# Patient Record
Sex: MALE | Race: WHITE | Employment: FULL TIME | URBAN - METROPOLITAN AREA
[De-identification: names, ages, dates, MRNs, and addresses within clinical notes are randomized per-mention and may not be internally consistent; named-entity substitution may affect disease eponyms.]

---

## 2023-10-23 ENCOUNTER — TELEPHONE (OUTPATIENT)
Dept: PRIMARY CARE | Facility: CLINIC | Age: 53
End: 2023-10-23

## 2023-10-23 ENCOUNTER — APPOINTMENT (OUTPATIENT)
Dept: RADIOLOGY | Facility: HOSPITAL | Age: 53
End: 2023-10-23

## 2023-10-23 ENCOUNTER — HOSPITAL ENCOUNTER (EMERGENCY)
Facility: HOSPITAL | Age: 53
Discharge: HOME | End: 2023-10-23
Attending: EMERGENCY MEDICINE

## 2023-10-23 VITALS
TEMPERATURE: 97.7 F | DIASTOLIC BLOOD PRESSURE: 109 MMHG | SYSTOLIC BLOOD PRESSURE: 166 MMHG | RESPIRATION RATE: 14 BRPM | BODY MASS INDEX: 33.64 KG/M2 | WEIGHT: 235 LBS | OXYGEN SATURATION: 95 % | HEART RATE: 76 BPM | HEIGHT: 70 IN

## 2023-10-23 DIAGNOSIS — R00.2 PALPITATIONS: Primary | ICD-10-CM

## 2023-10-23 DIAGNOSIS — R07.9 CHEST PAIN, UNSPECIFIED TYPE: ICD-10-CM

## 2023-10-23 DIAGNOSIS — F19.10 SUBSTANCE ABUSE (MULTI): ICD-10-CM

## 2023-10-23 DIAGNOSIS — I10 HYPERTENSION, UNSPECIFIED TYPE: ICD-10-CM

## 2023-10-23 LAB
ALBUMIN SERPL-MCNC: 4.3 G/DL (ref 3.5–5)
ALP BLD-CCNC: 43 U/L (ref 35–125)
ALT SERPL-CCNC: 43 U/L (ref 5–40)
AMPHETAMINES UR QL SCN>1000 NG/ML: NEGATIVE
ANION GAP SERPL CALC-SCNC: 13 MMOL/L
APPEARANCE UR: CLEAR
AST SERPL-CCNC: 23 U/L (ref 5–40)
BARBITURATES UR QL SCN>300 NG/ML: NEGATIVE
BASOPHILS # BLD AUTO: 0.05 X10*3/UL (ref 0–0.1)
BASOPHILS NFR BLD AUTO: 0.7 %
BENZODIAZ UR QL SCN>300 NG/ML: NEGATIVE
BILIRUB SERPL-MCNC: 0.6 MG/DL (ref 0.1–1.2)
BILIRUB UR STRIP.AUTO-MCNC: NEGATIVE MG/DL
BUN SERPL-MCNC: 22 MG/DL (ref 8–25)
BZE UR QL SCN>300 NG/ML: NEGATIVE
CALCIUM SERPL-MCNC: 9.6 MG/DL (ref 8.5–10.4)
CANNABINOIDS UR QL SCN>50 NG/ML: POSITIVE
CHLORIDE SERPL-SCNC: 101 MMOL/L (ref 97–107)
CO2 SERPL-SCNC: 23 MMOL/L (ref 24–31)
COLOR UR: ABNORMAL
CREAT SERPL-MCNC: 1 MG/DL (ref 0.4–1.6)
EOSINOPHIL # BLD AUTO: 0.21 X10*3/UL (ref 0–0.7)
EOSINOPHIL NFR BLD AUTO: 3 %
ERYTHROCYTE [DISTWIDTH] IN BLOOD BY AUTOMATED COUNT: 14 % (ref 11.5–14.5)
ETHANOL SERPL-MCNC: <0.01 G/DL
FENTANYL+NORFENTANYL UR QL SCN: NEGATIVE
GFR SERPL CREATININE-BSD FRML MDRD: >90 ML/MIN/1.73M*2
GLUCOSE SERPL-MCNC: 193 MG/DL (ref 65–99)
GLUCOSE UR STRIP.AUTO-MCNC: ABNORMAL MG/DL
HCT VFR BLD AUTO: 44.4 % (ref 41–52)
HGB BLD-MCNC: 14.9 G/DL (ref 13.5–17.5)
IMM GRANULOCYTES # BLD AUTO: 0.02 X10*3/UL (ref 0–0.7)
IMM GRANULOCYTES NFR BLD AUTO: 0.3 % (ref 0–0.9)
KETONES UR STRIP.AUTO-MCNC: NEGATIVE MG/DL
LEUKOCYTE ESTERASE UR QL STRIP.AUTO: NEGATIVE
LYMPHOCYTES # BLD AUTO: 2.12 X10*3/UL (ref 1.2–4.8)
LYMPHOCYTES NFR BLD AUTO: 30.2 %
MCH RBC QN AUTO: 28.6 PG (ref 26–34)
MCHC RBC AUTO-ENTMCNC: 33.6 G/DL (ref 32–36)
MCV RBC AUTO: 85 FL (ref 80–100)
METHADONE UR QL SCN>300 NG/ML: NEGATIVE
MONOCYTES # BLD AUTO: 0.43 X10*3/UL (ref 0.1–1)
MONOCYTES NFR BLD AUTO: 6.1 %
NEUTROPHILS # BLD AUTO: 4.19 X10*3/UL (ref 1.2–7.7)
NEUTROPHILS NFR BLD AUTO: 59.7 %
NITRITE UR QL STRIP.AUTO: NEGATIVE
NRBC BLD-RTO: 0 /100 WBCS (ref 0–0)
NT-PROBNP SERPL-MCNC: <36 PG/ML (ref 0–138)
OPIATES UR QL SCN>300 NG/ML: NEGATIVE
OXYCODONE UR QL: NEGATIVE
PCP UR QL SCN>25 NG/ML: NEGATIVE
PH UR STRIP.AUTO: 6 [PH]
PLATELET # BLD AUTO: 253 X10*3/UL (ref 150–450)
PMV BLD AUTO: 9.8 FL (ref 7.5–11.5)
POTASSIUM SERPL-SCNC: 4.4 MMOL/L (ref 3.4–5.1)
PROT SERPL-MCNC: 7.2 G/DL (ref 5.9–7.9)
PROT UR STRIP.AUTO-MCNC: ABNORMAL MG/DL
RBC # BLD AUTO: 5.21 X10*6/UL (ref 4.5–5.9)
RBC # UR STRIP.AUTO: NEGATIVE /UL
RBC #/AREA URNS AUTO: NORMAL /HPF
SODIUM SERPL-SCNC: 137 MMOL/L (ref 133–145)
SP GR UR STRIP.AUTO: 1.03
TROPONIN T SERPL-MCNC: 7 NG/L
TROPONIN T SERPL-MCNC: 8 NG/L
TSH SERPL DL<=0.05 MIU/L-ACNC: 2.8 MIU/L (ref 0.27–4.2)
UROBILINOGEN UR STRIP.AUTO-MCNC: NORMAL MG/DL
WBC # BLD AUTO: 7 X10*3/UL (ref 4.4–11.3)
WBC #/AREA URNS AUTO: NORMAL /HPF

## 2023-10-23 PROCEDURE — 80307 DRUG TEST PRSMV CHEM ANLYZR: CPT | Performed by: EMERGENCY MEDICINE

## 2023-10-23 PROCEDURE — 96361 HYDRATE IV INFUSION ADD-ON: CPT

## 2023-10-23 PROCEDURE — 84484 ASSAY OF TROPONIN QUANT: CPT | Performed by: EMERGENCY MEDICINE

## 2023-10-23 PROCEDURE — 99284 EMERGENCY DEPT VISIT MOD MDM: CPT | Mod: 25

## 2023-10-23 PROCEDURE — 85025 COMPLETE CBC W/AUTO DIFF WBC: CPT | Performed by: EMERGENCY MEDICINE

## 2023-10-23 PROCEDURE — 96374 THER/PROPH/DIAG INJ IV PUSH: CPT

## 2023-10-23 PROCEDURE — 93005 ELECTROCARDIOGRAM TRACING: CPT

## 2023-10-23 PROCEDURE — 83880 ASSAY OF NATRIURETIC PEPTIDE: CPT | Performed by: EMERGENCY MEDICINE

## 2023-10-23 PROCEDURE — 36415 COLL VENOUS BLD VENIPUNCTURE: CPT | Performed by: EMERGENCY MEDICINE

## 2023-10-23 PROCEDURE — 2500000004 HC RX 250 GENERAL PHARMACY W/ HCPCS (ALT 636 FOR OP/ED): Performed by: EMERGENCY MEDICINE

## 2023-10-23 PROCEDURE — 81001 URINALYSIS AUTO W/SCOPE: CPT | Performed by: EMERGENCY MEDICINE

## 2023-10-23 PROCEDURE — 82077 ASSAY SPEC XCP UR&BREATH IA: CPT | Performed by: EMERGENCY MEDICINE

## 2023-10-23 PROCEDURE — 84443 ASSAY THYROID STIM HORMONE: CPT | Performed by: EMERGENCY MEDICINE

## 2023-10-23 PROCEDURE — 80053 COMPREHEN METABOLIC PANEL: CPT | Performed by: EMERGENCY MEDICINE

## 2023-10-23 PROCEDURE — 71045 X-RAY EXAM CHEST 1 VIEW: CPT | Mod: FY

## 2023-10-23 PROCEDURE — 2500000001 HC RX 250 WO HCPCS SELF ADMINISTERED DRUGS (ALT 637 FOR MEDICARE OP): Performed by: EMERGENCY MEDICINE

## 2023-10-23 RX ORDER — ASPIRIN 325 MG
325 TABLET ORAL ONCE
Status: COMPLETED | OUTPATIENT
Start: 2023-10-23 | End: 2023-10-23

## 2023-10-23 RX ORDER — FAMOTIDINE 10 MG/ML
20 INJECTION INTRAVENOUS ONCE
Status: COMPLETED | OUTPATIENT
Start: 2023-10-23 | End: 2023-10-23

## 2023-10-23 RX ADMIN — FAMOTIDINE 20 MG: 10 INJECTION INTRAVENOUS at 09:11

## 2023-10-23 RX ADMIN — SODIUM CHLORIDE 500 ML: 900 INJECTION, SOLUTION INTRAVENOUS at 09:11

## 2023-10-23 RX ADMIN — ASPIRIN 325 MG: 325 TABLET ORAL at 09:11

## 2023-10-23 ASSESSMENT — COLUMBIA-SUICIDE SEVERITY RATING SCALE - C-SSRS
1. IN THE PAST MONTH, HAVE YOU WISHED YOU WERE DEAD OR WISHED YOU COULD GO TO SLEEP AND NOT WAKE UP?: NO
2. HAVE YOU ACTUALLY HAD ANY THOUGHTS OF KILLING YOURSELF?: NO
6. HAVE YOU EVER DONE ANYTHING, STARTED TO DO ANYTHING, OR PREPARED TO DO ANYTHING TO END YOUR LIFE?: NO

## 2023-10-23 ASSESSMENT — PAIN DESCRIPTION - DESCRIPTORS: DESCRIPTORS: SHARP

## 2023-10-23 ASSESSMENT — PAIN - FUNCTIONAL ASSESSMENT
PAIN_FUNCTIONAL_ASSESSMENT: 0-10
PAIN_FUNCTIONAL_ASSESSMENT: 0-10

## 2023-10-23 ASSESSMENT — LIFESTYLE VARIABLES
HAVE YOU EVER FELT YOU SHOULD CUT DOWN ON YOUR DRINKING: NO
EVER FELT BAD OR GUILTY ABOUT YOUR DRINKING: NO
HAVE PEOPLE ANNOYED YOU BY CRITICIZING YOUR DRINKING: NO
REASON UNABLE TO ASSESS: NO
EVER HAD A DRINK FIRST THING IN THE MORNING TO STEADY YOUR NERVES TO GET RID OF A HANGOVER: NO

## 2023-10-23 ASSESSMENT — PAIN SCALES - GENERAL
PAINLEVEL_OUTOF10: 2
PAINLEVEL_OUTOF10: 0 - NO PAIN

## 2023-10-23 NOTE — ED TRIAGE NOTES
Patient complains of nonstop palpations and feeling flush lasting all weekend. Has a quick sharp pain and slight nausea. Patient states he has numbness and tingling in legs and feet that is new withthin the past 2 weeks.

## 2023-10-23 NOTE — TELEPHONE ENCOUNTER
See telephone encounter. Nurse spoke with PCP and advised nurse to tell patient to go to ED. Patient advised.

## 2023-10-23 NOTE — DISCHARGE INSTRUCTIONS
Follow up with your primary care physician within 1 to 2 days for further management of your current symptoms and repeat check of your blood pressure      Follow-up with cardiology within 1 week      Return to the emergency department sooner with worsening of symptoms or onset of new symptoms

## 2023-10-23 NOTE — Clinical Note
Ricci Tamara was seen and treated in our emergency department on 10/23/2023.  He may return to work on 10/24/2023.       If you have any questions or concerns, please don't hesitate to call.      Elsie Madsen MD

## 2023-10-23 NOTE — ED PROVIDER NOTES
HPI   Chief Complaint   Patient presents with    Palpitations       This is a 53-year-old male who presents to the emergency department with worsening palpitations.  Patient states for the last 2 years he has had on and off sensation of palpitations with substernal chest tightness..  Patient states he came to the emergency department today because over the weekend his palpitations have been consistent for the most part.  Patient states that he feels his heartbeat in his chest with associated tightness in his chest and feeling of shortness of breath.  Patient also states that he feels occasional tingling in his hands bilaterally with the palpitations.  States when he has the palpitations he feels that he does not want to sit still.  Patient has a past medical history of diabetes and hypertension.  Patient takes metformin, valsartan, an antacid and metoprolol.  Patient did not take his medications this morning.  To include his blood pressure medications. patient denies sudden onset of tearing, ripping, sharp pain radiating from his chest to his back or belly.  Patient denies back or belly pain.            Please see HPI for pertinent positive and negative ROS. The remaining 10 point ROS negative.                  No data recorded                Patient History   No past medical history on file.  No past surgical history on file.  No family history on file.  Social History     Tobacco Use    Smoking status: Not on file    Smokeless tobacco: Not on file   Substance Use Topics    Alcohol use: Not on file    Drug use: Not on file       Physical Exam   ED Triage Vitals   Temp Pulse Resp BP   -- -- -- --      SpO2 Temp src Heart Rate Source Patient Position   -- -- -- --      BP Location FiO2 (%)     -- --       Physical Exam  GENERAL APPEARANCE:  AxOx3, generally well-appearing, appears mildly anxious on presentation.  HEENT:  Normocephalic, atraumatic. Moist mucous membranes. Extraocular movements intact b/l, clear  conjunctiva, anicteric, oropharynx clear.  NECK:  Supple neck. No stiffness or restricted ROM.  HEART:  Normal rate and regular rhythm with clear S1 and S2. No murmur was appreciated on auscultation.   LUNGS:  Lungs CTA b/l without adventitious sounds. Symmetric rise and fall of chest wall.   ABDOMEN:  Soft, nontender, nondistended without palpable masses. EXTREMITIES:  All 4 extremities without visible cyanosis or edema.  NEUROLOGICAL:  Alert and oriented, moving all 4 extremities. Patient answering questions appropriately.   SKIN:  Warm and dry without any rash.  PYSCH: Cooperative with appropriate mood and affect.     ED Course & MDM   ED Course as of 10/23/23 1540   Mon Oct 23, 2023   1324 Reviewed labs just pending chest x-ray.  Labs look reassuring.  Patient has had no acute change in the emergency department.  Will discuss results with patient pending x-ray review. [SC]      ED Course User Index  [SC] Elsa Mccall PA-C         Diagnoses as of 10/23/23 1540   Palpitations   Hypertension, unspecified type   Chest pain, unspecified type   Substance abuse (CMS/formerly [facility])       Medical Decision Making  Patient was seen in conjucntion with my supervising physician,  Dr. Madsen. Please refer to her note.    Parts of this chart have been completed using voice recognition software. Please excuse any errors of transcription.  My thought process and reason for plan has been formulated from the time that I saw the patient until the time of disposition and is not specific to one specific moment during their visit and furthermore my MDM encompasses this entire chart and not only this text box.      HPI: Detailed above.    Exam: A medically appropriate exam performed, outlined above, given the known history and presentation.    History obtained from: Patient    EKG: See my supervising physician's EKG interpretation    Social Determinants of Health considered during this visit: Lives at home    Medications given during  visit:  Medications   famotidine PF (Pepcid) injection 20 mg (20 mg intravenous Given 10/23/23 0911)   sodium chloride 0.9 % bolus 500 mL (0 mL intravenous Stopped 10/23/23 1035)   aspirin tablet 325 mg (325 mg oral Given 10/23/23 0911)        Diagnostic/tests  Labs Reviewed   URINALYSIS WITH REFLEX MICROSCOPIC - Abnormal       Result Value    Color, Urine Light-Yellow      Appearance, Urine Clear      Specific Gravity, Urine 1.026      pH, Urine 6.0      Protein, Urine 10 (TRACE)      Glucose, Urine 150 (2+) (*)     Blood, Urine NEGATIVE      Ketones, Urine NEGATIVE      Bilirubin, Urine NEGATIVE      Urobilinogen, Urine Normal      Nitrite, Urine NEGATIVE      Leukocyte Esterase, Urine NEGATIVE     DRUG SCREEN,URINE - Abnormal    Amphetamine Screen, Urine Negative      Barbiturate Screen, Urine Negative      Benzodiazepines Screen, Urine Negative      Cannabinoid Screen, Urine Positive (*)     Cocaine Metabolite Screen, Urine Negative      Fentanyl Screen, Urine Negative      Methadone Screen, Urine Negative      Opiate Screen, Urine Negative      Oxycodone Screen, Urine Negative      PCP Screen, Urine Negative      Narrative:     These toxicological screening tests provide unconfirmed qualitative measurements to aid in treatment and diagnosis in cases of drug use or overdose. This test is used only for medical purposes. A positive result does not indicate or measure intoxication. For specific test performance or pathologist consultation, please contact the Laboratory.    The following threshold concentrations are used for these analyses.Values at or above the threshold concentration are reported as positive. Values below the threshold are reported as negative.    Drug /Screening Threshold                                                                                                 THC/CANNABINOIDS................50 ng/ml  METHADONE......................300 ng/ml  COCAINE METABOLITES............300  ng/ml  BENZODIAZEPINE.................300 ng/ml  PCP.............................25 ng/ml  OPIATE.........................300 ng/ml  AMPHETAMINE/ECSTASY...........1000 ng/ml  BARBITURATE....................200 ng/ml  OXYCODONE......................100 ng/ml  FENTANYL.........................5 ng/ml       COMPREHENSIVE METABOLIC PANEL - Abnormal    Glucose 193 (*)     Sodium 137      Potassium 4.4      Chloride 101      Bicarbonate 23 (*)     Urea Nitrogen 22      Creatinine 1.00      eGFR >90      Calcium 9.6      Albumin 4.3      Alkaline Phosphatase 43      Total Protein 7.2      AST 23      Bilirubin, Total 0.6      ALT 43 (*)     Anion Gap 13     TSH - Normal    Thyroid Stimulating Hormone 2.80     N-TERMINAL PROBNP - Normal    PROBNP <36      Narrative:     Reference ranges are based on clinical submission data. These ranges represent the 95th percentile of normal cut-off points. As NT Pro- BNP values approach 1000 pg/ml, clinical symptoms are more likely associated with CHF.   ALCOHOL - Normal    Alcohol <0.010     SERIAL TROPONIN, INITIAL ([facility]) - Normal    Troponin T, High Sensitivity 8     SERIAL TROPONIN,  2 HOUR ([facility]) - Normal    Troponin T, High Sensitivity 7     MICROSCOPIC ONLY, URINE - Normal    WBC, Urine 1-5      RBC, Urine NONE     CBC WITH AUTO DIFFERENTIAL    WBC 7.0      nRBC 0.0      RBC 5.21      Hemoglobin 14.9      Hematocrit 44.4      MCV 85      MCH 28.6      MCHC 33.6      RDW 14.0      Platelets 253      MPV 9.8      Neutrophils % 59.7      Immature Granulocytes %, Automated 0.3      Lymphocytes % 30.2      Monocytes % 6.1      Eosinophils % 3.0      Basophils % 0.7      Neutrophils Absolute 4.19      Immature Granulocytes Absolute, Automated 0.02      Lymphocytes Absolute 2.12      Monocytes Absolute 0.43      Eosinophils Absolute 0.21      Basophils Absolute 0.05     TROPONIN T SERIES, HIGH SENSITIVITY (0, 2 HR, 6 HR)    Narrative:     The following orders were created for panel order  Troponin T Series, High Sensitivity (0, 2HR, 6HR).  Procedure                               Abnormality         Status                     ---------                               -----------         ------                     Serial Troponin, Initial...[[id number]]  Normal              Final result               Serial Troponin, 2 Hour ...[[id number]]  Normal              Final result               Serial Troponin, 6 Hour ...[[id number]]                                                   Please view results for these tests on the individual orders.   SERIAL TROPONIN, 6 HOUR ([facility])      XR chest 1 view   Final Result   No acute cardiopulmonary disease.        Signed by: Won Zaman 10/23/2023 2:25 PM   Dictation workstation:   [id number]           Considerations/further MDM:  The patient was seen in conjunction with the supervising physician, Dr. Madsen. Please review her supervision note.    The patient was evaluated in the emergency department and an etiology requiring emergent treatment or admission to hospital was not identified.  All labs, imaging, and diagnostic studies were reviewed by me and the patient was counseled on clinical impression, expectations, and plan.  Patient was educated to follow-up with PCP in the following 1 to 2 days.  Patient was educated that today in the emergency department he had no events on telemetry to suggest a malignant arrhythmia.  Patient was also educated that his emergency department evaluation showed no signs of aortic dissection, pulmonary embolism, pneumothorax, pneumonia, or acute myocardial infarction.  All questions from patient were answered.  He was educated that although a cause of his palpitations and chest tightness were not identified today he needs to follow-up with her primary care doctor for further evaluation.  Patient's blood pressure is elevated due to him not taking his blood pressure medications this morning.  Patient verbalized that he did not take his  blood pressure medication this morning.  Patient to follow-up with his primary care doctor in 1 to 2 days.  Patient was also educated about urine drug screen finding.  Patient was aware that he does use marijuana and states he knows he does need to try to decrease this.  patient did verbalize understanding. they elicited understanding and were agreeable to course of treatment.  Patient was discharged in stable condition and given strict return precautions including new or worsening symptoms.      Wells Score 0  HEART Score 2    Procedure  Procedures     Elsa Mccall PA-C  10/23/23 1540

## 2023-10-23 NOTE — PROGRESS NOTES
Attestation note/provider note for MANJIT Mccall    The patient is a 53-year-old male presenting to the emergency department for evaluation of palpitations, numbness and tingling of his fingers and intermittent substernal chest pain.  He states he has had ongoing symptoms for the past 2 years.  He states it has been worsening over the past 7 months.  He states that sometimes he feels like his fingers get tingly and numb bilaterally.  He states that sometimes he has some substernal chest pain that is sharp.  He states that today he came to the emergency room because he feels like his heart is racing.  He denies any current chest pain.  He denies any diaphoresis.  No headache or visual changes.  No neck or back pain.  No abdominal pain.  No nausea or vomiting.  No diarrhea or constipation but no urinary complaints.  He denies any history of PE or DVT.  No history of CAD or ACS.  He does have a history of hypertension and diabetes.  All pertinent positives and negatives are recorded above.  All other systems reviewed and otherwise negative.  Vital signs with hypertension but otherwise within normal limits.  Physical exam with a well-nourished well-developed male who appears slightly anxious but otherwise has no evidence of acute distress.  HEENT exam within normal limits.  He has no evidence of airway compromise or respiratory distress.  Abdominal exam is benign.  He has no gross motor, neurologic or vascular deficits on exam.  NIH stroke scale score of 0.      EKG with normal sinus rhythm at 84 bpm, normal axis, normal voltage, normal ST segment, normal T waves      Oral aspirin, IV fluids and IV Pepcid ordered.      Diagnostic labs with evidence of substance abuse, electrolyte imbalance and glycosuria but no evidence of DKA.      Initial Troponin T 8.  Repeat Troponin T of 7.  DeltaTroponin T of 1      Heart score of 2      Chest x-ray  IMPRESSION:  No acute cardiopulmonary disease.      The patient did not have any  evidence of ischemia on EKG or cardiac enzymes.  No events on telemetry.  Chest x-ray without acute process.  No pneumonia, pneumothorax no widened mediastinum.  The patient does have a mild electrolyte imbalance and glycosuria on diagnostic labs but no evidence of DKA.  Heart score is 2.      The patient was released in good condition.  He was instructed to follow-up with his primary care physician within 1 to 2 days for further management of his current symptoms and repeat check of his blood pressure.  He was also given a referral to cardiology.  He will return to the emergency department sooner with worsening of symptoms or onset of new symptoms.      I personally saw the patient and performed a substantive portion of the visit including all aspects of the medical decision making.      I reviewed the results of the diagnostic labs and diagnostic imaging.  Formal radiology reading was completed by the radiologist            Elsie Madsen MD

## 2023-10-25 ENCOUNTER — HOSPITAL ENCOUNTER (OUTPATIENT)
Dept: CARDIOLOGY | Facility: HOSPITAL | Age: 53
Discharge: HOME | End: 2023-10-25

## 2023-10-25 LAB
ATRIAL RATE: 84 BPM
P AXIS: 71 DEGREES
P OFFSET: 200 MS
P ONSET: 138 MS
PR INTERVAL: 174 MS
Q ONSET: 225 MS
QRS COUNT: 13 BEATS
QRS DURATION: 84 MS
QT INTERVAL: 360 MS
QTC CALCULATION(BAZETT): 425 MS
QTC FREDERICIA: 402 MS
R AXIS: 100 DEGREES
T AXIS: 50 DEGREES
T OFFSET: 405 MS
VENTRICULAR RATE: 84 BPM

## 2023-12-23 DIAGNOSIS — I10 BENIGN ESSENTIAL HYPERTENSION: ICD-10-CM

## 2023-12-26 PROBLEM — I10 BENIGN ESSENTIAL HYPERTENSION: Status: ACTIVE | Noted: 2023-12-26

## 2023-12-26 PROBLEM — K21.9 GASTROESOPHAGEAL REFLUX DISEASE WITHOUT ESOPHAGITIS: Status: ACTIVE | Noted: 2023-12-26

## 2023-12-26 PROBLEM — F41.1 GAD (GENERALIZED ANXIETY DISORDER): Status: ACTIVE | Noted: 2023-12-26

## 2023-12-28 RX ORDER — VALSARTAN AND HYDROCHLOROTHIAZIDE 320; 12.5 MG/1; MG/1
1 TABLET, FILM COATED ORAL DAILY
Qty: 90 TABLET | Refills: 0 | Status: SHIPPED | OUTPATIENT
Start: 2023-12-28 | End: 2024-04-02

## 2024-01-08 PROBLEM — E11.9 TYPE 2 DIABETES MELLITUS WITHOUT COMPLICATION, WITHOUT LONG-TERM CURRENT USE OF INSULIN (MULTI): Status: ACTIVE | Noted: 2024-01-08

## 2024-03-30 DIAGNOSIS — I10 BENIGN ESSENTIAL HYPERTENSION: ICD-10-CM

## 2024-04-02 RX ORDER — VALSARTAN AND HYDROCHLOROTHIAZIDE 320; 12.5 MG/1; MG/1
1 TABLET, FILM COATED ORAL DAILY
Qty: 30 TABLET | Refills: 0 | Status: SHIPPED | OUTPATIENT
Start: 2024-04-02 | End: 2024-04-18 | Stop reason: SDUPTHER

## 2024-04-02 NOTE — TELEPHONE ENCOUNTER
Patient overdue for follow-up.  30-day coverage sent.  Please encourage him to schedule follow-up at earliest convenience

## 2024-04-17 PROBLEM — G47.33 OSA (OBSTRUCTIVE SLEEP APNEA): Status: ACTIVE | Noted: 2024-04-17

## 2024-04-17 PROBLEM — E55.9 VITAMIN D DEFICIENCY: Status: ACTIVE | Noted: 2024-04-17

## 2024-04-17 NOTE — ASSESSMENT & PLAN NOTE
- A1c sugar average performed in office today which was 9.0%; this is increased compared to 7.1% in June 2023  -Worsening sugars is likely byproduct of poor diet, decreased activity and having been temporarily/modifying your metformin dose  -Will continue on current regimen along with healthy, low-fat diet and moderation of carbohydrates  -Annual diabetic eye exam advocated

## 2024-04-17 NOTE — PROGRESS NOTES
Outpatient Visit Note    Chief Complaint   Patient presents with    Follow-up     6 month f/u         HPI:  Ricci Mcdonald is a 54 y.o. male with a past medical history significant for diabetes, anxiety/insomnia, hypertension, ROSY and vitamin-D deficiencywho presents to the office for overdue follow-up.  He was last seen in the office on 6/6/2023 as a new patient with request to establish care and for medication follow-up.           He was previously established with [facility], having last been seen on 12/09/2022 for medication follow-up    Last blood work completed 09/22/2022 including TSH and vitamin D level which was notable for vitamin-D deficiency.  In interval, patient did present to the emergency department in October 2023 secondary to complaints of chest pain/palpitations.  Blood work was completed at that time which included CBC, CMP, troponins, BNP and TSH.  Blood work was remarkable for hyperglycemia.  Last A1c was performed in June 2023 which was 7.1%          Diabetes:  Patient has been on regimen of max dose metformin with last A1c at 7.1 % on 6/6/2023. Denies any polyuria, polydipsia, polyphagia or acute vision/sensation changes.  Admits to having run out of his medication for several weeks to which she was stretching out his metformin regimen.  With recent prescription refill, he has been taking medication more consistently for the last 3 weeks.  States that his diet was very poor over the last 3 to 4 months, reporting to have been laid off.  During this time, he was less active as well.           Hypertension:  Has been compliant with regimen of valsartan-hydrochlorothiazide 320/12.5 mg daily though was also of blood pressure medication for several weeks. Denies any chest pain, shortness a breath, lightheadedness or dizziness. Denies any overt palpitations at this time. Of note, patient did complete CT coronary calcium scoring in May 2022, to which patient had a 0  score.    Of note, patient does state that approximately 2 weeks ago he did have acute episode of upset stomach with associated diarrhea and nausea.  Since episode, he has continued to have prominent bloating/abdominal discomfort.  Stomach seems to be sensitive with foods that he eats though he does not have consistent/constant diarrhea.  No reports of melena/hematochezia.  Denies ever having GI consultation or colon cancer screening.  Does have history of GERD which he has attempted to avoid trigger foods.    Lastly, he does report ongoing struggles with sleep.  Had been previously utilizing marijuana to help with symptom control though he did not find this to be effective and often made him feel worse.  Denies daily sleep issues though often has trouble falling asleep and staying asleep.  Has previously tried over-the-counter regimens such as melatonin and ZzzQuil which were not well-tolerated.    Current Medications  Current Outpatient Medications   Medication Instructions    metFORMIN (GLUCOPHAGE) 1,000 mg, oral, 2 times daily with meals    OMEPRAZOLE ORAL oral, Every other day    traZODone (DESYREL) 50 mg, oral, Nightly PRN    valsartan-hydrochlorothiazide (Diovan-HCT) 320-12.5 mg tablet 1 tablet, oral, Daily        Allergies  No Known Allergies     Past Medical History:   Diagnosis Date    Anxiety     Diabetes mellitus (Multi)     Insomnia       History reviewed. No pertinent surgical history.  Family History   Problem Relation Name Age of Onset    Pancreatic cancer Mother      Diabetes Father       Social History     Tobacco Use    Smoking status: Never    Smokeless tobacco: Never   Vaping Use    Vaping status: Never Used   Substance Use Topics    Alcohol use: Not Currently    Drug use: Not Currently     Types: Marijuana       ROS  All pertinent positive symptoms are included in the history of present illness.  All other systems have been reviewed and are negative and noncontributory to this patient's current  ailments.      VITAL SIGNS  Vitals:    04/18/24 0832   BP: 146/82   Pulse: 94   Temp: 36.2 °C (97.2 °F)   SpO2: 97%       PHYSICAL EXAM  GENERAL APPEARANCE: alert and oriented, Pleasant and cooperative, No Acute Distress  HEENT: EOMI, PERRLA, MMM  HEART: RRR, normal S1S2, no murmurs, click or rubs  LUNGS: clear to auscultation bilaterally, no wheezes/rhonchi/rales  ABDOMEN: soft, NT/ND, no guarding or rigidity, no masses palpated, no hepatosplenomegaly.   EXTREMITIES: no edema, normal ROM  SKIN: normal, no rash, unremarkable  NEUROLOGIC EXAM: non-focal exam  MUSCULOSKELETAL: no gross abnormalities  PSYCH: affect is normal, eye contact is good      Assessment/Plan   Problem List Items Addressed This Visit             ICD-10-CM    Benign essential hypertension I10     - Blood pressure stable though mildly elevated in office today   -Will focus on compliance with medication along with dietary modification and hopeful weight loss  -Continue to focus on healthy, balanced diet with moderation of salt/caffeine/alcohol         Relevant Medications    valsartan-hydrochlorothiazide (Diovan-HCT) 320-12.5 mg tablet    Other Relevant Orders    TSH with reflex to Free T4 if abnormal    Lipid Panel    Comprehensive Metabolic Panel    CBC    CHELSEA (generalized anxiety disorder) F41.1    Type 2 diabetes mellitus without complication, without long-term current use of insulin (Multi) - Primary E11.9     - A1c sugar average performed in office today which was 9.0%; this is increased compared to 7.1% in June 2023  -Worsening sugars is likely byproduct of poor diet, decreased activity and having been temporarily/modifying your metformin dose  -Will continue on current regimen along with healthy, low-fat diet and moderation of carbohydrates  -Annual diabetic eye exam advocated         Relevant Medications    metFORMIN (Glucophage) 500 mg tablet    Other Relevant Orders    POCT glycosylated hemoglobin (Hb A1C) manually resulted (Completed)     TSH with reflex to Free T4 if abnormal    Lipid Panel    Comprehensive Metabolic Panel    Albumin, urine, random    Vitamin D deficiency E55.9    Relevant Orders    Vitamin D 25-Hydroxy,Total (for eval of Vitamin D levels)    Other insomnia G47.09     - With ongoing sleep issues, recommend trial of trazodone to which I have sent prescription to pharmacy         Relevant Medications    traZODone (Desyrel) 50 mg tablet     Other Visit Diagnoses         Codes    Prostate cancer screening     Z12.5    Relevant Orders    Prostate Spec.Ag,Screen    Colon cancer screening     Z12.11    Relevant Orders    Cologuard® colon cancer screening    Need for hepatitis C screening test     Z11.59    Relevant Orders    Hepatitis C antibody            Counseling:       Medication education:         Education:  The patient is counseled regarding potential side-effects of all new medications        Understanding:  Patient expressed understanding        Adherence:  No barriers to adherence identified

## 2024-04-17 NOTE — ASSESSMENT & PLAN NOTE
- Blood pressure stable though mildly elevated in office today   -Will focus on compliance with medication along with dietary modification and hopeful weight loss  -Continue to focus on healthy, balanced diet with moderation of salt/caffeine/alcohol

## 2024-04-18 ENCOUNTER — OFFICE VISIT (OUTPATIENT)
Dept: PRIMARY CARE | Facility: CLINIC | Age: 54
End: 2024-04-18

## 2024-04-18 VITALS
HEIGHT: 70 IN | BODY MASS INDEX: 35.33 KG/M2 | OXYGEN SATURATION: 97 % | TEMPERATURE: 97.2 F | HEART RATE: 94 BPM | SYSTOLIC BLOOD PRESSURE: 146 MMHG | WEIGHT: 246.8 LBS | DIASTOLIC BLOOD PRESSURE: 82 MMHG

## 2024-04-18 DIAGNOSIS — Z12.11 COLON CANCER SCREENING: ICD-10-CM

## 2024-04-18 DIAGNOSIS — G47.09 OTHER INSOMNIA: ICD-10-CM

## 2024-04-18 DIAGNOSIS — Z11.59 NEED FOR HEPATITIS C SCREENING TEST: ICD-10-CM

## 2024-04-18 DIAGNOSIS — F41.1 GAD (GENERALIZED ANXIETY DISORDER): ICD-10-CM

## 2024-04-18 DIAGNOSIS — I10 BENIGN ESSENTIAL HYPERTENSION: ICD-10-CM

## 2024-04-18 DIAGNOSIS — E11.9 TYPE 2 DIABETES MELLITUS WITHOUT COMPLICATION, WITHOUT LONG-TERM CURRENT USE OF INSULIN (MULTI): Primary | ICD-10-CM

## 2024-04-18 DIAGNOSIS — Z12.5 PROSTATE CANCER SCREENING: ICD-10-CM

## 2024-04-18 DIAGNOSIS — E55.9 VITAMIN D DEFICIENCY: ICD-10-CM

## 2024-04-18 PROBLEM — R14.0 ABDOMINAL BLOATING WITH CRAMPS: Status: ACTIVE | Noted: 2024-04-18

## 2024-04-18 PROBLEM — R10.9 ABDOMINAL BLOATING WITH CRAMPS: Status: ACTIVE | Noted: 2024-04-18

## 2024-04-18 LAB — POC HEMOGLOBIN A1C: 9 % (ref 4.2–6.5)

## 2024-04-18 PROCEDURE — 1036F TOBACCO NON-USER: CPT | Performed by: FAMILY MEDICINE

## 2024-04-18 PROCEDURE — 3079F DIAST BP 80-89 MM HG: CPT | Performed by: FAMILY MEDICINE

## 2024-04-18 PROCEDURE — 99214 OFFICE O/P EST MOD 30 MIN: CPT | Performed by: FAMILY MEDICINE

## 2024-04-18 PROCEDURE — 3077F SYST BP >= 140 MM HG: CPT | Performed by: FAMILY MEDICINE

## 2024-04-18 PROCEDURE — 83036 HEMOGLOBIN GLYCOSYLATED A1C: CPT | Mod: QW | Performed by: FAMILY MEDICINE

## 2024-04-18 RX ORDER — TRAZODONE HYDROCHLORIDE 50 MG/1
50 TABLET ORAL NIGHTLY PRN
Qty: 30 TABLET | Refills: 2 | Status: SHIPPED | OUTPATIENT
Start: 2024-04-18

## 2024-04-18 RX ORDER — METFORMIN HYDROCHLORIDE 500 MG/1
1000 TABLET ORAL
Qty: 360 TABLET | Refills: 1 | Status: SHIPPED | OUTPATIENT
Start: 2024-04-18 | End: 2024-10-15

## 2024-04-18 RX ORDER — VALSARTAN AND HYDROCHLOROTHIAZIDE 320; 12.5 MG/1; MG/1
1 TABLET, FILM COATED ORAL DAILY
Qty: 90 TABLET | Refills: 1 | Status: SHIPPED | OUTPATIENT
Start: 2024-04-18 | End: 2024-10-15

## 2024-04-18 ASSESSMENT — PATIENT HEALTH QUESTIONNAIRE - PHQ9
1. LITTLE INTEREST OR PLEASURE IN DOING THINGS: MORE THAN HALF THE DAYS
2. FEELING DOWN, DEPRESSED OR HOPELESS: NOT AT ALL
10. IF YOU CHECKED OFF ANY PROBLEMS, HOW DIFFICULT HAVE THESE PROBLEMS MADE IT FOR YOU TO DO YOUR WORK, TAKE CARE OF THINGS AT HOME, OR GET ALONG WITH OTHER PEOPLE: SOMEWHAT DIFFICULT
SUM OF ALL RESPONSES TO PHQ9 QUESTIONS 1 AND 2: 2

## 2024-04-18 ASSESSMENT — PAIN SCALES - GENERAL: PAINLEVEL: 2

## 2024-04-18 NOTE — ASSESSMENT & PLAN NOTE
- Low FODMAP handout given  -OTC probiotic advocated  -Assess blood work for any contributing abnormalities though this is likely dietary in nature or byproduct of a recent stomach bug  -Will plan to coordinate Cologuard colon cancer screening with plans to set up formal GI consultation if irregularities continue

## 2024-04-18 NOTE — PATIENT INSTRUCTIONS
Problem List Items Addressed This Visit             ICD-10-CM    Benign essential hypertension I10     - Blood pressure stable though mildly elevated in office today   -Will focus on compliance with medication along with dietary modification and hopeful weight loss  -Continue to focus on healthy, balanced diet with moderation of salt/caffeine/alcohol         Relevant Medications    valsartan-hydrochlorothiazide (Diovan-HCT) 320-12.5 mg tablet    Other Relevant Orders    TSH with reflex to Free T4 if abnormal    Lipid Panel    Comprehensive Metabolic Panel    CBC    CHELSEA (generalized anxiety disorder) F41.1    Type 2 diabetes mellitus without complication, without long-term current use of insulin (Multi) - Primary E11.9     - A1c sugar average performed in office today which was 9.0%; this is increased compared to 7.1% in June 2023  -Worsening sugars is likely byproduct of poor diet, decreased activity and having been temporarily/modifying your metformin dose  -Will continue on current regimen along with healthy, low-fat diet and moderation of carbohydrates  -Annual diabetic eye exam advocated         Relevant Medications    metFORMIN (Glucophage) 500 mg tablet    Other Relevant Orders    POCT glycosylated hemoglobin (Hb A1C) manually resulted    TSH with reflex to Free T4 if abnormal    Lipid Panel    Comprehensive Metabolic Panel    Albumin, urine, random    Vitamin D deficiency E55.9    Relevant Orders    Vitamin D 25-Hydroxy,Total (for eval of Vitamin D levels)    Other insomnia G47.09     - With ongoing sleep issues, recommend trial of trazodone to which I have sent prescription to pharmacy         Relevant Medications    traZODone (Desyrel) 50 mg tablet     Other Visit Diagnoses         Codes    Prostate cancer screening     Z12.5    Relevant Orders    Prostate Spec.Ag,Screen    Colon cancer screening     Z12.11    Relevant Orders    Cologuard® colon cancer screening    Need for hepatitis C screening test      Z11.59    Relevant Orders    Hepatitis C antibody            Counseling:       Medication education:         Education:  The patient is counseled regarding potential side-effects of all new medications        Understanding:  Patient expressed understanding        Adherence:  No barriers to adherence identified

## 2024-04-18 NOTE — ASSESSMENT & PLAN NOTE
- With ongoing sleep issues, recommend trial of trazodone to which I have sent prescription to pharmacy

## 2024-04-24 ENCOUNTER — TELEPHONE (OUTPATIENT)
Dept: PRIMARY CARE | Facility: CLINIC | Age: 54
End: 2024-04-24

## 2024-04-24 DIAGNOSIS — G47.09 OTHER INSOMNIA: Primary | ICD-10-CM

## 2024-04-24 NOTE — TELEPHONE ENCOUNTER
Pt came in to office stating he was having some side effects of his trazodone after taking one dose. Sx are restlessness, wide awake, mind was racing, feeling sad, angry. He states he has been using gummies which have helped him sleep. Pt states he is not doing cologuard at this time d/t cost. Please advise if there is an alternative for trazodone.

## 2024-04-25 NOTE — TELEPHONE ENCOUNTER
Secondary to his sensitivity to trazodone, I would recommend that he be seen in consultation by her sleep medicine folks to discuss other potential therapeutic options.  Referral placed

## 2024-06-10 ENCOUNTER — APPOINTMENT (OUTPATIENT)
Dept: SLEEP MEDICINE | Facility: CLINIC | Age: 54
End: 2024-06-10

## 2024-11-03 DIAGNOSIS — I10 BENIGN ESSENTIAL HYPERTENSION: ICD-10-CM

## 2024-11-05 RX ORDER — VALSARTAN AND HYDROCHLOROTHIAZIDE 320; 12.5 MG/1; MG/1
1 TABLET, FILM COATED ORAL DAILY
Qty: 90 TABLET | Refills: 1 | Status: SHIPPED | OUTPATIENT
Start: 2024-11-05 | End: 2025-05-04

## 2024-11-14 ENCOUNTER — OFFICE VISIT (OUTPATIENT)
Dept: PRIMARY CARE | Facility: CLINIC | Age: 54
End: 2024-11-14

## 2024-11-14 VITALS
HEIGHT: 70 IN | BODY MASS INDEX: 32.78 KG/M2 | OXYGEN SATURATION: 97 % | HEART RATE: 87 BPM | WEIGHT: 229 LBS | TEMPERATURE: 97.7 F | SYSTOLIC BLOOD PRESSURE: 138 MMHG | DIASTOLIC BLOOD PRESSURE: 80 MMHG

## 2024-11-14 DIAGNOSIS — G47.09 OTHER INSOMNIA: ICD-10-CM

## 2024-11-14 DIAGNOSIS — E11.9 TYPE 2 DIABETES MELLITUS WITHOUT COMPLICATION, WITHOUT LONG-TERM CURRENT USE OF INSULIN (MULTI): Primary | ICD-10-CM

## 2024-11-14 DIAGNOSIS — I10 BENIGN ESSENTIAL HYPERTENSION: ICD-10-CM

## 2024-11-14 LAB — POC HEMOGLOBIN A1C: 6.9 % (ref 4.2–6.5)

## 2024-11-14 PROCEDURE — 3079F DIAST BP 80-89 MM HG: CPT | Performed by: FAMILY MEDICINE

## 2024-11-14 PROCEDURE — 83036 HEMOGLOBIN GLYCOSYLATED A1C: CPT | Performed by: FAMILY MEDICINE

## 2024-11-14 PROCEDURE — 1036F TOBACCO NON-USER: CPT | Performed by: FAMILY MEDICINE

## 2024-11-14 PROCEDURE — 3075F SYST BP GE 130 - 139MM HG: CPT | Performed by: FAMILY MEDICINE

## 2024-11-14 PROCEDURE — 3008F BODY MASS INDEX DOCD: CPT | Performed by: FAMILY MEDICINE

## 2024-11-14 PROCEDURE — 99214 OFFICE O/P EST MOD 30 MIN: CPT | Performed by: FAMILY MEDICINE

## 2024-11-14 RX ORDER — METFORMIN HYDROCHLORIDE 500 MG/1
500 TABLET ORAL
Qty: 180 TABLET | Refills: 1 | Status: SHIPPED | OUTPATIENT
Start: 2024-11-14 | End: 2025-05-13

## 2024-11-14 RX ORDER — VALSARTAN AND HYDROCHLOROTHIAZIDE 320; 12.5 MG/1; MG/1
1 TABLET, FILM COATED ORAL DAILY
Qty: 90 TABLET | Refills: 1 | Status: SHIPPED | OUTPATIENT
Start: 2024-11-14 | End: 2025-05-13

## 2024-11-14 RX ORDER — TRAZODONE HYDROCHLORIDE 50 MG/1
50 TABLET ORAL NIGHTLY PRN
Qty: 30 TABLET | Refills: 2 | Status: SHIPPED | OUTPATIENT
Start: 2024-11-14 | End: 2024-11-14 | Stop reason: ENTERED-IN-ERROR

## 2024-11-14 ASSESSMENT — PATIENT HEALTH QUESTIONNAIRE - PHQ9
SUM OF ALL RESPONSES TO PHQ9 QUESTIONS 1 AND 2: 0
2. FEELING DOWN, DEPRESSED OR HOPELESS: NOT AT ALL
1. LITTLE INTEREST OR PLEASURE IN DOING THINGS: NOT AT ALL

## 2024-11-14 ASSESSMENT — PAIN SCALES - GENERAL: PAINLEVEL_OUTOF10: 0-NO PAIN

## 2024-11-14 NOTE — PROGRESS NOTES
Outpatient Visit Note    Chief Complaint   Patient presents with    Follow-up         HPI:  Ricci Mcdonald is a 54 y.o. male with a past medical history significant for diabetes, anxiety/insomnia, hypertension, ROSY and vitamin-D deficiency who presents to the office for follow-up.  He was last seen in the office in April 2024 for overdue follow-up.           He was previously established with [facility], having last been seen on 12/09/2022 for medication follow-up.    Last blood work completed 09/22/2022 including TSH and vitamin D level which was notable for vitamin-D deficiency.  In interval, patient did present to the emergency department in October 2023 secondary to complaints of chest pain/palpitations.  Blood work was completed at that time which included CBC, CMP, troponins, BNP and TSH.  Blood work was remarkable for hyperglycemia.  Orders for repeat blood work were given at last encounter which have yet to be completed.    A1c was performed in June 2023 which was 7.1% with most recent A1c in April showing increased to 9%.  Increase in sugar average was attributed towards poor diet, limited exercise and patient modifying/stretching out metformin.  Plan was set to return to structured regimen with healthy lifestyle and 3-month follow-up though patient has not been seen since today          Diabetes:  Patient has been on regimen of max dose metformin with last A1c at 7.1 % on 6/6/2023.  In interval he reports onset of dizziness approximately 3 months ago which improved upon decreasing metformin to 500 mg twice a day.  He denies any polyuria, polydipsia, polyphagia or acute vision/sensation changes.  States that his diet has been fairly poor recently though has made a concerted effort to eat better with a nearly 20 pound weight since April.           Hypertension:  Has been compliant with regimen of valsartan-hydrochlorothiazide 320/12.5 mg daily though was also of blood pressure  medication for several weeks. Denies any chest pain, shortness a breath, lightheadedness or dizziness. Denies any overt palpitations at this time. Of note, patient did complete CT coronary calcium scoring in May 2022, to which patient had a 0 score.    At last encounter he reported ongoing struggles with sleep.  Had been previously utilizing marijuana to help with symptom control though only when using an edible form.  Has been out recently and attempted to smoke marijuana which was not well-tolerated.  Had previously tried over-the-counter regimens such as melatonin and ZzzQuil which were not well-tolerated.  Trial of trazodone was ultimately discussed with prescription sent.  States filled prescription though never tried medication.  Will plan to try and see if this helps.    Current Medications  Current Outpatient Medications   Medication Instructions    metFORMIN (GLUCOPHAGE) 500 mg, oral, 2 times daily (morning and late afternoon)    OMEPRAZOLE ORAL Take by mouth. Every other day    valsartan-hydrochlorothiazide (Diovan-HCT) 320-12.5 mg tablet 1 tablet, oral, Daily        Allergies  No Known Allergies     Past Medical History:   Diagnosis Date    Anxiety     Diabetes mellitus (Multi)     Insomnia       History reviewed. No pertinent surgical history.  Family History   Problem Relation Name Age of Onset    Pancreatic cancer Mother      Diabetes Father       Social History     Tobacco Use    Smoking status: Never    Smokeless tobacco: Never   Vaping Use    Vaping status: Never Used   Substance Use Topics    Alcohol use: Not Currently    Drug use: Not Currently     Types: Marijuana       ROS  All pertinent positive symptoms are included in the history of present illness.  All other systems have been reviewed and are negative and noncontributory to this patient's current ailments.    VITAL SIGNS  Vitals:    11/14/24 1430   BP: 138/80   Pulse: 87   Temp: 36.5 °C (97.7 °F)   SpO2: 97%     PHYSICAL EXAM  GENERAL  APPEARANCE: alert and oriented, Pleasant and cooperative, No Acute Distress  HEENT: EOMI, PERRLA, MMM  HEART: RRR, normal S1S2, no murmurs, click or rubs  LUNGS: clear to auscultation bilaterally, no wheezes/rhonchi/rales  ABDOMEN: soft, NT/ND, no guarding or rigidity, no masses palpated, no hepatosplenomegaly.   EXTREMITIES: no edema, normal ROM  SKIN: normal, no rash, unremarkable  NEUROLOGIC EXAM: non-focal exam  MUSCULOSKELETAL: no gross abnormalities  PSYCH: affect is normal, eye contact is good      Assessment/Plan   Problem List Items Addressed This Visit             ICD-10-CM    Benign essential hypertension I10     - Blood pressure stable in office today   -Will focus on compliance with medication along with dietary modification and hopeful weight loss  -Continue to focus on healthy, balanced diet with moderation of salt/caffeine/alcohol         Relevant Medications    valsartan-hydrochlorothiazide (Diovan-HCT) 320-12.5 mg tablet    Type 2 diabetes mellitus without complication, without long-term current use of insulin (Multi) - Primary E11.9     - A1c sugar average performed in office today which was 6.9%; this is improved compared to 9.0% in April 2024  - will continue with twice daily metformin 500mg   -Will continue on current regimen along with healthy, low-fat diet and moderation of carbohydrates  -Annual diabetic eye exam advocated         Relevant Medications    metFORMIN (Glucophage) 500 mg tablet    Other Relevant Orders    POCT glycosylated hemoglobin (Hb A1C) manually resulted (Completed)    Other insomnia G47.09     - Will plan to trial trazodone utilizing previous prescription obtained          Please complete fasting blood work at earliest convenience    Counseling:       Medication education:         Education:  The patient is counseled regarding potential side-effects of all new medications        Understanding:  Patient expressed understanding        Adherence:  No barriers to adherence  identified

## 2024-11-14 NOTE — ASSESSMENT & PLAN NOTE
- A1c sugar average performed in office today which was 6.9%; this is improved compared to 9.0% in April 2024  - will continue with twice daily metformin 500mg   -Will continue on current regimen along with healthy, low-fat diet and moderation of carbohydrates  -Annual diabetic eye exam advocated

## 2024-11-14 NOTE — PATIENT INSTRUCTIONS
Problem List Items Addressed This Visit             ICD-10-CM    Benign essential hypertension I10     - Blood pressure stable in office today   -Will focus on compliance with medication along with dietary modification and hopeful weight loss  -Continue to focus on healthy, balanced diet with moderation of salt/caffeine/alcohol         Relevant Medications    valsartan-hydrochlorothiazide (Diovan-HCT) 320-12.5 mg tablet    Type 2 diabetes mellitus without complication, without long-term current use of insulin (Multi) - Primary E11.9     - A1c sugar average performed in office today which was 6.9%; this is improved compared to 9.0% in April 2024  - will continue with twice daily metformin 500mg   -Will continue on current regimen along with healthy, low-fat diet and moderation of carbohydrates  -Annual diabetic eye exam advocated         Relevant Medications    metFORMIN (Glucophage) 500 mg tablet    Other Relevant Orders    POCT glycosylated hemoglobin (Hb A1C) manually resulted (Completed)    Other insomnia G47.09     - Will plan to trial trazodone utilizing previous prescription obtained          Please complete fasting blood work at earliest convenience    Counseling:       Medication education:         Education:  The patient is counseled regarding potential side-effects of all new medications        Understanding:  Patient expressed understanding        Adherence:  No barriers to adherence identified

## 2024-11-14 NOTE — ASSESSMENT & PLAN NOTE
- Blood pressure stable in office today   -Will focus on compliance with medication along with dietary modification and hopeful weight loss  -Continue to focus on healthy, balanced diet with moderation of salt/caffeine/alcohol